# Patient Record
Sex: MALE | Race: WHITE | NOT HISPANIC OR LATINO | Employment: FULL TIME | URBAN - METROPOLITAN AREA
[De-identification: names, ages, dates, MRNs, and addresses within clinical notes are randomized per-mention and may not be internally consistent; named-entity substitution may affect disease eponyms.]

---

## 2019-02-15 ENCOUNTER — TELEPHONE (OUTPATIENT)
Dept: GASTROENTEROLOGY | Facility: CLINIC | Age: 39
End: 2019-02-15

## 2019-02-15 DIAGNOSIS — K64.9 HEMORRHOIDS, UNSPECIFIED HEMORRHOID TYPE: ICD-10-CM

## 2019-02-15 DIAGNOSIS — K62.5 RECTAL BLEEDING: Primary | ICD-10-CM

## 2019-02-15 NOTE — TELEPHONE ENCOUNTER
Pt called to schedule colonoscopy ordered from October 2018 visit with [facility]  He is scheduled for 3/12/19  He had prep at that time, but did not  RX at the pharmacy, believes he still has instructions; will check  Needs phone prep redo  Given to MA to complete

## 2019-02-28 RX ORDER — VALSARTAN AND HYDROCHLOROTHIAZIDE 160; 25 MG/1; MG/1
1 TABLET ORAL DAILY
COMMUNITY

## 2019-02-28 NOTE — TELEPHONE ENCOUNTER
Phone prep with pt  Dx: rectal bleeding, hemorrhoids  Rx sent to provider for signature  Instructions for suprep given  Meds reviewed  Pt has all paperwork   WT: 225 HT:5'10" BMI: 32 3

## 2019-02-28 NOTE — TELEPHONE ENCOUNTER
Pt left [facility] stating Shannan Claros called/can reach him on cell [phone]

## 2019-03-04 ENCOUNTER — TELEPHONE (OUTPATIENT)
Dept: GASTROENTEROLOGY | Facility: CLINIC | Age: 39
End: 2019-03-04

## 2019-03-12 ENCOUNTER — TELEPHONE (OUTPATIENT)
Dept: GASTROENTEROLOGY | Facility: CLINIC | Age: 39
End: 2019-03-12

## 2019-03-12 DIAGNOSIS — K59.04 FUNCTIONAL CONSTIPATION: Primary | ICD-10-CM

## 2019-03-12 NOTE — TELEPHONE ENCOUNTER
Pt had a procedure today and was given as script for Linzess 145 mcg 1 daily, 90 with 3 refills per Dr Dwain Shaw

## 2019-04-02 ENCOUNTER — OFFICE VISIT (OUTPATIENT)
Dept: GASTROENTEROLOGY | Facility: CLINIC | Age: 39
End: 2019-04-02
Payer: COMMERCIAL

## 2019-04-02 VITALS
SYSTOLIC BLOOD PRESSURE: 130 MMHG | HEIGHT: 69 IN | BODY MASS INDEX: 33.33 KG/M2 | WEIGHT: 225 LBS | HEART RATE: 64 BPM | DIASTOLIC BLOOD PRESSURE: 80 MMHG

## 2019-04-02 DIAGNOSIS — Z12.11 COLON CANCER SCREENING: ICD-10-CM

## 2019-04-02 DIAGNOSIS — K64.1 GRADE II HEMORRHOIDS: Primary | ICD-10-CM

## 2019-04-02 DIAGNOSIS — K59.04 CHRONIC IDIOPATHIC CONSTIPATION: ICD-10-CM

## 2019-04-02 PROCEDURE — 46221 LIGATION OF HEMORRHOID(S): CPT | Performed by: INTERNAL MEDICINE

## 2019-04-26 ENCOUNTER — OFFICE VISIT (OUTPATIENT)
Dept: GASTROENTEROLOGY | Facility: CLINIC | Age: 39
End: 2019-04-26
Payer: COMMERCIAL

## 2019-04-26 VITALS
HEIGHT: 69 IN | WEIGHT: 224 LBS | SYSTOLIC BLOOD PRESSURE: 140 MMHG | HEART RATE: 65 BPM | BODY MASS INDEX: 33.18 KG/M2 | DIASTOLIC BLOOD PRESSURE: 82 MMHG

## 2019-04-26 DIAGNOSIS — K64.1 GRADE II HEMORRHOIDS: ICD-10-CM

## 2019-04-26 PROCEDURE — 46221 LIGATION OF HEMORRHOID(S): CPT | Performed by: INTERNAL MEDICINE

## 2019-05-16 ENCOUNTER — OFFICE VISIT (OUTPATIENT)
Dept: GASTROENTEROLOGY | Facility: CLINIC | Age: 39
End: 2019-05-16
Payer: COMMERCIAL

## 2019-05-16 VITALS
HEIGHT: 69 IN | SYSTOLIC BLOOD PRESSURE: 144 MMHG | BODY MASS INDEX: 32.88 KG/M2 | DIASTOLIC BLOOD PRESSURE: 94 MMHG | HEART RATE: 78 BPM | WEIGHT: 222 LBS

## 2019-05-16 DIAGNOSIS — K59.04 CHRONIC IDIOPATHIC CONSTIPATION: Primary | ICD-10-CM

## 2019-05-16 DIAGNOSIS — K64.8 INTERNAL HEMORRHOIDS: ICD-10-CM

## 2019-05-16 PROCEDURE — 46221 LIGATION OF HEMORRHOID(S): CPT | Performed by: INTERNAL MEDICINE

## 2019-05-16 PROCEDURE — 99213 OFFICE O/P EST LOW 20 MIN: CPT | Performed by: INTERNAL MEDICINE

## 2019-06-28 ENCOUNTER — OFFICE VISIT (OUTPATIENT)
Dept: GASTROENTEROLOGY | Facility: CLINIC | Age: 39
End: 2019-06-28
Payer: COMMERCIAL

## 2019-06-28 VITALS
HEART RATE: 58 BPM | SYSTOLIC BLOOD PRESSURE: 138 MMHG | BODY MASS INDEX: 33.18 KG/M2 | WEIGHT: 224 LBS | DIASTOLIC BLOOD PRESSURE: 82 MMHG | HEIGHT: 69 IN

## 2019-06-28 DIAGNOSIS — K64.1 GRADE II HEMORRHOIDS: ICD-10-CM

## 2019-06-28 DIAGNOSIS — K59.04 CHRONIC IDIOPATHIC CONSTIPATION: Primary | ICD-10-CM

## 2019-06-28 PROCEDURE — 99213 OFFICE O/P EST LOW 20 MIN: CPT | Performed by: INTERNAL MEDICINE

## 2019-06-28 RX ORDER — LUBIPROSTONE 8 UG/1
8 CAPSULE, GELATIN COATED ORAL 2 TIMES DAILY WITH MEALS
Qty: 60 CAPSULE | Refills: 2 | Status: SHIPPED | OUTPATIENT
Start: 2019-06-28 | End: 2019-06-28

## 2019-06-28 RX ORDER — LUBIPROSTONE 8 UG/1
8 CAPSULE, GELATIN COATED ORAL 2 TIMES DAILY WITH MEALS
Qty: 60 CAPSULE | Refills: 2 | Status: SHIPPED | OUTPATIENT
Start: 2019-06-28 | End: 2019-10-08 | Stop reason: ALTCHOICE

## 2019-07-15 DIAGNOSIS — K59.04 CHRONIC IDIOPATHIC CONSTIPATION: Primary | ICD-10-CM

## 2019-07-15 NOTE — TELEPHONE ENCOUNTER
[facility]ed [facility], Poonam Al, and gave her a verbal order for the Amitiza 8 mcg BID with meals, 180 with 1 refill  She noted that being a mail order needs to be 90 days  She is unable to give a cost of the medication as is not yet processed  Requested pt call after 24 hours to discuss the cost and if wants to proceed with the order  He can call [phone]  Pt notified and # written for him on his sample bag  He will call back if has any other issues

## 2019-07-15 NOTE — TELEPHONE ENCOUNTER
Pt called stating was started on samples of Amitiza 8 mcg BID and was very effective  He went to have the script filled at [facility] and was $300 which he can not afford  Questions if can be sent to [facility] for 30 days as his insurance will only cover 30 at a time for a cheaper amount? Also questions if we have any more samples?    2 boxes of samples ready for the pt to

## 2019-08-01 ENCOUNTER — OFFICE VISIT (OUTPATIENT)
Dept: GASTROENTEROLOGY | Facility: CLINIC | Age: 39
End: 2019-08-01
Payer: COMMERCIAL

## 2019-08-01 VITALS
DIASTOLIC BLOOD PRESSURE: 82 MMHG | WEIGHT: 222 LBS | HEIGHT: 69 IN | SYSTOLIC BLOOD PRESSURE: 118 MMHG | HEART RATE: 64 BPM | BODY MASS INDEX: 32.88 KG/M2

## 2019-08-01 DIAGNOSIS — K58.1 IRRITABLE BOWEL SYNDROME WITH CONSTIPATION: Primary | ICD-10-CM

## 2019-08-01 DIAGNOSIS — K64.2 GRADE III HEMORRHOIDS: ICD-10-CM

## 2019-08-01 DIAGNOSIS — K42.9 UMBILICAL HERNIA WITHOUT OBSTRUCTION AND WITHOUT GANGRENE: ICD-10-CM

## 2019-08-01 PROCEDURE — 99213 OFFICE O/P EST LOW 20 MIN: CPT | Performed by: INTERNAL MEDICINE

## 2019-08-01 NOTE — PROGRESS NOTES
[facility] - Outpatient Follow-up Note  Malia Catherine 35 y o  male MRN: [id number]  Encounter: [id number]    ASSESSMENT AND PLAN:      1  Irritable bowel syndrome with constipation  Significant improvement on Amitiza 8 mcg twice daily  We will increase to 24 mcg twice daily, he wants to try samples 1st but they are not available, we will contact him when they arrive    2  Grade III hemorrhoids  Improved, but not resolved, with treating constipation and straining  Once lower GI symptoms stabilize he may benefit from repeat banding    3  Umbilical hernia without obstruction and without gangrene  Plan surgical repair when constipation/straining improves      Followup Appointment:  2 months  ______________________________________________________________________    Chief Complaint   Patient presents with    Follow up, IBS     HPI:  The patient returns for follow-up on constipation predominant IBS and internal hemorrhoids  Symptoms have been much better controlled since starting Amitiza 8 mcg twice daily  He typically has a stool every other day  Stools typically took 45 min, now they take about 20  He still needs to strain to initiate a bowel movement  Rectal bleeding is much less significant any feels that his hemorrhoids are smaller  He continues to take fiber twice daily    He takes a senna tea as needed    Historical Information   Past Medical History:   Diagnosis Date    Constipation     Hemorrhoids     Hypercholesterolemia     Hypertension      Past Surgical History:   Procedure Laterality Date    COLONOSCOPY  03/12/2019    One hyperplastic polyp, recall at age 47   [address] History     Substance and Sexual Activity   Alcohol Use Yes    Frequency: 2-4 times a month     Social History     Substance and Sexual Activity   Drug Use Never     Social History     Tobacco Use   Smoking Status Never Smoker   Smokeless Tobacco Never Used     Family History   Problem Relation Age of Onset    Hypertension Mother     Hypertension Father     Hypertension Brother     Colon cancer Neg Hx     Colon polyps Neg Hx          Current Outpatient Medications:     lubiprostone (AMITIZA) 8 mcg capsule    valsartan-hydrochlorothiazide (DIOVAN-HCT) 160-25 MG per tablet  No Known Allergies    10 Point REVIEW OF SYSTEMS IS OTHERWISE NEGATIVE  PHYSICAL EXAM:    Blood pressure 118/82, pulse 64, height 5' 9" (1 753 m), weight 101 kg (222 lb)  Body mass index is 32 78 kg/m²  General Appearance:  Alert, cooperative, no distress  HEENT:  Normocephalic, atraumatic, anicteric  Neck: Supple, symmetrical, trachea midline  Lungs: Clear to auscultation bilaterally; no rales, rhonchi or wheezing; respirations unlabored   Heart: Regular rate and rhythm; no murmur, rub, or gallop  Abdomen:   Soft, non-tender, non-distended; normal bowel sounds; no masses, no organomegaly   Rectal:  Deferred   Extremities:  No cyanosis, clubbing or edema   Skin:  No jaundice, rashes, or lesions   Lymph nodes: No palpable cervical lymphadenopathy     Lab Results:   No results found for: WBC, HGB, HCT, MCV, PLT  No results found for: NA, K, CL, CO2, ANIONGAP, BUN, CREATININE, GLUCOSE, GLUF, CALCIUM, CORRECTEDCA, AST, ALT, ALKPHOS, PROT, BILITOT, EGFR  No results found for: IRON, TIBC, FERRITIN  No results found for: LIPASE    Radiology Results:   No results found

## 2019-08-01 NOTE — LETTER
August 1, 2019     Severo Escamilla MD  [address] [place] [address]  [address] [place] [address]    Patient: Lisseth Peña   YOB: 1980   Date of Visit: 8/1/2019       Dear Dr Geovanna Ricks: Thank you for referring Lisseth Peña to me for evaluation  Below are my notes for this consultation  If you have questions, please do not hesitate to call me  I look forward to following your patient along with you  Sincerely,        Rosy Damon DO        CC: No Recipients  Rosy Damon DO  8/1/2019  5:19 PM  Sign at close encounter  [facility] - Outpatient Follow-up Note  Lisseth Peña 35 y o  male MRN: [id number]  Encounter: [id number]    ASSESSMENT AND PLAN:      1  Irritable bowel syndrome with constipation  Significant improvement on Amitiza 8 mcg twice daily  We will increase to 24 mcg twice daily, he wants to try samples 1st but they are not available, we will contact him when they arrive    2  Grade III hemorrhoids  Improved, but not resolved, with treating constipation and straining  Once lower GI symptoms stabilize he may benefit from repeat banding    3  Umbilical hernia without obstruction and without gangrene  Plan surgical repair when constipation/straining improves      Followup Appointment:  2 months  ______________________________________________________________________    Chief Complaint   Patient presents with    Follow up, IBS     HPI:  The patient returns for follow-up on constipation predominant IBS and internal hemorrhoids  Symptoms have been much better controlled since starting Amitiza 8 mcg twice daily  He typically has a stool every other day  Stools typically took 45 min, now they take about 20  He still needs to strain to initiate a bowel movement  Rectal bleeding is much less significant any feels that his hemorrhoids are smaller  He continues to take fiber twice daily    He takes a senna tea as needed    Historical Information   Past Medical History:   Diagnosis Date    Constipation     Hemorrhoids     Hypercholesterolemia     Hypertension      Past Surgical History:   Procedure Laterality Date    COLONOSCOPY  03/12/2019    One hyperplastic polyp, recall at age 47   [address] History     Substance and Sexual Activity   Alcohol Use Yes    Frequency: 2-4 times a month     Social History     Substance and Sexual Activity   Drug Use Never     Social History     Tobacco Use   Smoking Status Never Smoker   Smokeless Tobacco Never Used     Family History   Problem Relation Age of Onset    Hypertension Mother     Hypertension Father     Hypertension Brother     Colon cancer Neg Hx     Colon polyps Neg Hx          Current Outpatient Medications:     lubiprostone (AMITIZA) 8 mcg capsule    valsartan-hydrochlorothiazide (DIOVAN-HCT) 160-25 MG per tablet  No Known Allergies    10 Point REVIEW OF SYSTEMS IS OTHERWISE NEGATIVE  PHYSICAL EXAM:    Blood pressure 118/82, pulse 64, height 5' 9" (1 753 m), weight 101 kg (222 lb)  Body mass index is 32 78 kg/m²  General Appearance:  Alert, cooperative, no distress  HEENT:  Normocephalic, atraumatic, anicteric  Neck: Supple, symmetrical, trachea midline  Lungs: Clear to auscultation bilaterally; no rales, rhonchi or wheezing; respirations unlabored   Heart: Regular rate and rhythm; no murmur, rub, or gallop  Abdomen:   Soft, non-tender, non-distended; normal bowel sounds; no masses, no organomegaly   Rectal:  Deferred   Extremities:  No cyanosis, clubbing or edema   Skin:  No jaundice, rashes, or lesions   Lymph nodes: No palpable cervical lymphadenopathy     Lab Results:   No results found for: WBC, HGB, HCT, MCV, PLT  No results found for: NA, K, CL, CO2, ANIONGAP, BUN, CREATININE, GLUCOSE, GLUF, CALCIUM, CORRECTEDCA, AST, ALT, ALKPHOS, PROT, BILITOT, EGFR  No results found for: IRON, TIBC, FERRITIN  No results found for: LIPASE    Radiology Results:   No results found

## 2019-10-03 ENCOUNTER — OFFICE VISIT (OUTPATIENT)
Dept: GASTROENTEROLOGY | Facility: CLINIC | Age: 39
End: 2019-10-03
Payer: COMMERCIAL

## 2019-10-03 VITALS
SYSTOLIC BLOOD PRESSURE: 162 MMHG | WEIGHT: 224 LBS | BODY MASS INDEX: 33.18 KG/M2 | HEIGHT: 69 IN | HEART RATE: 68 BPM | DIASTOLIC BLOOD PRESSURE: 100 MMHG

## 2019-10-03 DIAGNOSIS — K59.04 CHRONIC IDIOPATHIC CONSTIPATION: Primary | ICD-10-CM

## 2019-10-03 DIAGNOSIS — K64.2 GRADE III HEMORRHOIDS: ICD-10-CM

## 2019-10-03 PROCEDURE — 99213 OFFICE O/P EST LOW 20 MIN: CPT | Performed by: INTERNAL MEDICINE

## 2019-10-03 NOTE — PROGRESS NOTES
[facility] - Outpatient Follow-up Note  Linden Vega 35 y o  male MRN: [id number]  Encounter: [id number]    ASSESSMENT AND PLAN:      1  IBS with constipation  Tried increasing Amitiza 24 mcg twice daily with irregular stools, occasional good days but typically still needs to strain  Provided a prescription and co-pay card for Trulance    2  Grade III hemorrhoids  Clinical improvement but he still notes blood with most stools  Continue to work on constipation and straining  Once these have improved will discuss repeat banding or surgical referral      Followup Appointment:  2-3 months  ______________________________________________________________________    Chief Complaint   Patient presents with    Follow-up     IBS     HPI:  The patient returns for follow-up on IBS-c and hemorrhoidal bleeding  At his last visit we increased Amitiza to 24 b i d  There was some small improvement  Once or twice per week he had 1 easy stool per day  Other days he had loose stool with return trips to the toilet requiring constipation  He continues to see blood with nearly every stool  He denies any abdominal pain or pain with defecation      Historical Information   Past Medical History:   Diagnosis Date    Constipation     Hemorrhoids     Hypercholesterolemia     Hypertension      Past Surgical History:   Procedure Laterality Date    COLONOSCOPY  03/12/2019    One hyperplastic polyp, recall at age 47   [address] History     Substance and Sexual Activity   Alcohol Use Yes    Frequency: 2-4 times a month     Social History     Substance and Sexual Activity   Drug Use Never     Social History     Tobacco Use   Smoking Status Never Smoker   Smokeless Tobacco Never Used     Family History   Problem Relation Age of Onset    Hypertension Mother     Hypertension Father     Hypertension Brother     Colon cancer Neg Hx     Colon polyps Neg Hx          Current Outpatient Medications:     lubiprostone (AMITIZA) 8 mcg capsule    valsartan-hydrochlorothiazide (DIOVAN-HCT) 160-25 MG per tablet  No Known Allergies    PHYSICAL EXAM:    Blood pressure 162/100, pulse 68, height 5' 9" (1 753 m), weight 102 kg (224 lb)  Body mass index is 33 08 kg/m²  General Appearance: NAD, cooperative, alert  Eyes: Anicteric, PERRLA, EOMI  ENT:  Normocephalic, atraumatic, normal mucosa  Neck:  Supple, symmetrical, trachea midline  Resp:  Clear to auscultation bilaterally; no rales, rhonchi or wheezing; respirations unlabored   CV:  S1 S2, Regular rate and rhythm; no murmur, rub, or gallop  GI:  Soft, non-tender, non-distended; normal bowel sounds; no masses, no organomegaly   Rectal:  1 small reducible grade 3 hemorrhoid  Musculoskeletal: No cyanosis, clubbing or edema  Normal ROM  Skin:  No jaundice, rashes, or lesions   Heme/Lymph: No palpable cervical lymphadenopathy  Psych: Normal affect, good eye contact  Neuro: No gross deficits, AAOx3    Lab Results:   No results found for: WBC, HGB, HCT, MCV, PLT  No results found for: NA, K, CL, CO2, ANIONGAP, BUN, CREATININE, GLUCOSE, GLUF, CALCIUM, CORRECTEDCA, AST, ALT, ALKPHOS, PROT, BILITOT, EGFR  No results found for: IRON, TIBC, FERRITIN  No results found for: LIPASE    Radiology Results:   No results found

## 2019-10-08 DIAGNOSIS — K59.04 CHRONIC IDIOPATHIC CONSTIPATION: ICD-10-CM

## 2019-10-08 NOTE — TELEPHONE ENCOUNTER
Pt left  mssg stating he saw Dr Jenni Watson last [address] and was supposed to get Trulance prescribed; [facility] and no order; questions if he was going to get the Amitiza again instead? If Amitiza needs the 24 not 8   If Trulance, questions how to get ordered; req  [phone]      Pt notified [facility] to [facility] will be re-sent

## 2019-10-08 NOTE — TELEPHONE ENCOUNTER
Original medication order printed and did not go to [facility]   I called and provided verbal     BIN [id number] PCN [facility] [id number] ID [id number]

## 2019-10-09 NOTE — TELEPHONE ENCOUNTER
We do not have any amitiza or trulance samples  Patient advised  We are going to try sending a short rx to [facility] for Amitiza 24 takes 2 a day qty 10 to see if patient can afford this

## 2019-10-09 NOTE — TELEPHONE ENCOUNTER
Pt left [facility] stating he called yesterday and conf'd Trulance script went but will not be sent out until 10/14; asks if he can  Amitza samples?/only has one day left  [phone]

## 2019-10-10 NOTE — TELEPHONE ENCOUNTER
Left message for patient to review the Trulance savings card to see if we can get that filled locally and he could use it to get the drug

## 2019-10-10 NOTE — TELEPHONE ENCOUNTER
Spoke with patient again  He did not keep the savings card and states they tried that with Amitiza already  Patient is [facility] the Amitiza 24 BID so he can get the pricing on that    Routing to [facility] to sign off/send rx for KK and routing to [facility] as [employer]

## 2019-10-10 NOTE — TELEPHONE ENCOUNTER
Pt left  mssg stating script was put through to [facility] but Trulance is $3000; also the nurse sent short supply of Amitiza to [facility] but it's $80 for about 10 pills    # [phone]

## 2019-11-21 ENCOUNTER — OFFICE VISIT (OUTPATIENT)
Dept: GASTROENTEROLOGY | Facility: CLINIC | Age: 39
End: 2019-11-21
Payer: COMMERCIAL

## 2019-11-21 VITALS
DIASTOLIC BLOOD PRESSURE: 84 MMHG | WEIGHT: 229 LBS | HEART RATE: 71 BPM | HEIGHT: 69 IN | BODY MASS INDEX: 33.92 KG/M2 | SYSTOLIC BLOOD PRESSURE: 142 MMHG

## 2019-11-21 DIAGNOSIS — K42.9 UMBILICAL HERNIA WITHOUT OBSTRUCTION AND WITHOUT GANGRENE: ICD-10-CM

## 2019-11-21 DIAGNOSIS — K59.04 CHRONIC IDIOPATHIC CONSTIPATION: Primary | ICD-10-CM

## 2019-11-21 DIAGNOSIS — K64.2 GRADE III HEMORRHOIDS: ICD-10-CM

## 2019-11-21 PROCEDURE — 46221 LIGATION OF HEMORRHOID(S): CPT | Performed by: INTERNAL MEDICINE

## 2019-11-21 PROCEDURE — 99213 OFFICE O/P EST LOW 20 MIN: CPT | Performed by: INTERNAL MEDICINE

## 2019-11-21 NOTE — PROGRESS NOTES
[address] [facility] - Outpatient Follow-up Note  Rosie Vaz 35 y o  male MRN: [id number]  Encounter: [id number]    ASSESSMENT AND PLAN:      1  Chronic idiopathic constipation  Very good control with Amitiza  He typically takes it once daily at bedtime  He will take b i d  If no bowel movement in more than 24 hours  He is very satisfied with symptom improvement  2  Grade III hemorrhoids  Treated with banding today    3  Umbilical hernia without obstruction and without gangrene  [facility]  Previous delayed due to constipation /straining, but this is much better controlled    The right posterior hemorrhoid area was banded today  The patient was instructed to avoid constipation and straining, and educated in appropriate fiber intake  HPI: Rosie Vaz is a 35y o  year old male who presents with rectal bleeding due to hemorrhoids  Previous treatments include:  Banding x3 in April 2019  Current Fiber intake:  Dietary fiber, Amitiza daily  Complications of prior therapy include:  None    The patient provided consent for banding  and was placed in the left lateral position  Rectal exam showed grade 3 hemorrhoid in the right posterior position  The [place] ligator was advanced and a band was applied without difficulty   Repeat rectal exam confirmed appropriate placement  The patient was discharged home after observation in the waiting area  Followup Appointment:  1 month  ______________________________________________________________________    Chief Complaint   Patient presents with    Follow-up    Hemorrhoids     HPI:  The patient returns for follow-up on constipation and hemorrhoids  At his last office visit he had fair improvement with Amitiza 24 b i d  (he was actually taking 3 8 micro g pills at a time)  We tried to prescribe Trulance but was cost prohibitive  We then started Amitiza 24 mcg pills, twice daily    These provided much better relief  He now takes 1 pill each day at bedtime  He typically has a spontaneous bowel movement the following day  If he has had no stool in 24 hours he will take the medication twice daily  He is very satisfied with symptom improvement  He reports minimal straining  He has a mild amount of blood that he ascribes to hemorrhoids    Historical Information   Past Medical History:   Diagnosis Date    Constipation     Hemorrhoids     Hypercholesterolemia     Hypertension      Past Surgical History:   Procedure Laterality Date    COLONOSCOPY  03/12/2019    One hyperplastic polyp, recall at age 47   [address] History     Substance and Sexual Activity   Alcohol Use Yes    Frequency: 2-4 times a month     Social History     Substance and Sexual Activity   Drug Use Never     Social History     Tobacco Use   Smoking Status Never Smoker   Smokeless Tobacco Never Used     Family History   Problem Relation Age of Onset    Hypertension Mother     Hypertension Father     Hypertension Brother     Colon cancer Neg Hx     Colon polyps Neg Hx          Current Outpatient Medications:     lubiprostone (AMITIZA) 24 mcg capsule    valsartan-hydrochlorothiazide (DIOVAN-HCT) 160-25 MG per tablet  No Known Allergies  Reviewed medications and allergies and updated as indicated    PHYSICAL EXAM:    Blood pressure 142/84, pulse 71, height 5' 9" (1 753 m), weight 104 kg (229 lb)  Body mass index is 33 82 kg/m²  General Appearance: NAD, cooperative, alert  Eyes: Anicteric, PERRLA, EOMI  ENT:  Normocephalic, atraumatic, normal mucosa  Neck:  Supple, symmetrical, trachea midline  Resp:  Clear to auscultation bilaterally; no rales, rhonchi or wheezing; respirations unlabored   CV:  S1 S2, Regular rate and rhythm; no murmur, rub, or gallop    GI:  Soft, non-tender, non-distended; normal bowel sounds; no masses, no organomegaly, reducible ventral hernia    Rectal:  Internal hemorrhoid, right posterior  Musculoskeletal: No cyanosis, clubbing or edema  Normal ROM  Skin:  No jaundice, rashes, or lesions   Heme/Lymph: No palpable cervical lymphadenopathy  Psych: Normal affect, good eye contact  Neuro: No gross deficits, AAOx3    Lab Results:   No results found for: WBC, HGB, HCT, MCV, PLT  No results found for: NA, K, CL, CO2, ANIONGAP, BUN, CREATININE, GLUCOSE, GLUF, CALCIUM, CORRECTEDCA, AST, ALT, ALKPHOS, PROT, BILITOT, EGFR  No results found for: IRON, TIBC, FERRITIN  No results found for: LIPASE    Radiology Results:   No results found

## 2019-11-21 NOTE — LETTER
November 21, 2019     Armida Dave MD  [address]  [address] [facility]    Patient: Frankie Pate   YOB: 1980   Date of Visit: 11/21/2019       Dear Dr Shauna Headley: Thank you for referring Frankie Pate to me for evaluation  Below are my notes for this consultation  If you have questions, please do not hesitate to call me  I look forward to following your patient along with you  Sincerely,        Laly Gonzales DO        CC: MD Laly Perez DO  11/21/2019  2:22 PM  Incomplete  [address] [facility] - Outpatient Follow-up Note  Frankie Pate 35 y o  male MRN: [id number]  Encounter: [id number]    ASSESSMENT AND PLAN:      1  Chronic idiopathic constipation  Very good control with Amitiza  He typically takes it once daily at bedtime  He will take b i d  If no bowel movement in more than 24 hours  He is very satisfied with symptom improvement  2  Grade III hemorrhoids  Treated with banding today    3  Umbilical hernia without obstruction and without gangrene  He will arrange consult with [facility] surgeons  Previous delayed due to constipation /straining, but this is much better controlled    The right posterior hemorrhoid area was banded today  The patient was instructed to avoid constipation and straining, and educated in appropriate fiber intake  HPI: Frankie Pate is a 35y o  year old male who presents with rectal bleeding due to hemorrhoids       Previous treatments include:  Banding x3 in April 2019  Current Fiber intake:  Dietary fiber, Amitiza daily  Complications of prior therapy include:  None    The patient provided consent for banding  and was placed in the left lateral position  Rectal exam showed grade 3 hemorrhoid in the right posterior position  The [place] ligator was advanced and a band was applied without difficulty   Repeat rectal exam confirmed appropriate placement  The patient was discharged home after observation in the waiting area  Followup Appointment:  1 month  ______________________________________________________________________    Chief Complaint   Patient presents with    Follow-up    Hemorrhoids     HPI:  The patient returns for follow-up on constipation and hemorrhoids  At his last office visit he had fair improvement with Amitiza 24 b i d  (he was actually taking 3 8 micro g pills at a time)  We tried to prescribe Trulance but was cost prohibitive  We then started Amitiza 24 mcg pills, twice daily  These provided much better relief  He now takes 1 pill each day at bedtime  He typically has a spontaneous bowel movement the following day  If he has had no stool in 24 hours he will take the medication twice daily  He is very satisfied with symptom improvement  He reports minimal straining    He has a mild amount of blood that he ascribes to hemorrhoids    Historical Information   Past Medical History:   Diagnosis Date    Constipation     Hemorrhoids     Hypercholesterolemia     Hypertension      Past Surgical History:   Procedure Laterality Date    COLONOSCOPY  03/12/2019    One hyperplastic polyp, recall at age 47   [address] History     Substance and Sexual Activity   Alcohol Use Yes    Frequency: 2-4 times a month     Social History     Substance and Sexual Activity   Drug Use Never     Social History     Tobacco Use   Smoking Status Never Smoker   Smokeless Tobacco Never Used     Family History   Problem Relation Age of Onset    Hypertension Mother     Hypertension Father     Hypertension Brother     Colon cancer Neg Hx     Colon polyps Neg Hx          Current Outpatient Medications:     lubiprostone (AMITIZA) 24 mcg capsule    valsartan-hydrochlorothiazide (DIOVAN-HCT) 160-25 MG per tablet  No Known Allergies  Reviewed medications and allergies and updated as indicated    PHYSICAL EXAM:    Blood pressure 142/84, pulse 71, height 5' 9" (1 753 m), weight 104 kg (229 lb)  Body mass index is 33 82 kg/m²  General Appearance: NAD, cooperative, alert  Eyes: Anicteric, PERRLA, EOMI  ENT:  Normocephalic, atraumatic, normal mucosa  Neck:  Supple, symmetrical, trachea midline  Resp:  Clear to auscultation bilaterally; no rales, rhonchi or wheezing; respirations unlabored   CV:  S1 S2, Regular rate and rhythm; no murmur, rub, or gallop  GI:  Soft, non-tender, non-distended; normal bowel sounds; no masses, no organomegaly, reducible ventral hernia    Rectal:  Internal hemorrhoid, right posterior  Musculoskeletal: No cyanosis, clubbing or edema  Normal ROM  Skin:  No jaundice, rashes, or lesions   Heme/Lymph: No palpable cervical lymphadenopathy  Psych: Normal affect, good eye contact  Neuro: No gross deficits, AAOx3    Lab Results:   No results found for: WBC, HGB, HCT, MCV, PLT  No results found for: NA, K, CL, CO2, ANIONGAP, BUN, CREATININE, GLUCOSE, GLUF, CALCIUM, CORRECTEDCA, AST, ALT, ALKPHOS, PROT, BILITOT, EGFR  No results found for: IRON, TIBC, FERRITIN  No results found for: LIPASE    Radiology Results:   No results found

## 2019-11-25 ENCOUNTER — TELEPHONE (OUTPATIENT)
Dept: GASTROENTEROLOGY | Facility: CLINIC | Age: 39
End: 2019-11-25

## 2019-11-25 ENCOUNTER — OFFICE VISIT (OUTPATIENT)
Dept: GASTROENTEROLOGY | Facility: CLINIC | Age: 39
End: 2019-11-25
Payer: COMMERCIAL

## 2019-11-25 VITALS
HEART RATE: 86 BPM | BODY MASS INDEX: 32.88 KG/M2 | DIASTOLIC BLOOD PRESSURE: 84 MMHG | HEIGHT: 69 IN | WEIGHT: 222 LBS | SYSTOLIC BLOOD PRESSURE: 132 MMHG

## 2019-11-25 DIAGNOSIS — Z12.11 COLON CANCER SCREENING: ICD-10-CM

## 2019-11-25 DIAGNOSIS — K59.09 OTHER CONSTIPATION: ICD-10-CM

## 2019-11-25 DIAGNOSIS — K62.5 RECTAL BLEEDING: ICD-10-CM

## 2019-11-25 DIAGNOSIS — K62.89 RECTAL PAIN: Primary | ICD-10-CM

## 2019-11-25 DIAGNOSIS — K64.2 GRADE III HEMORRHOIDS: ICD-10-CM

## 2019-11-25 PROCEDURE — 99214 OFFICE O/P EST MOD 30 MIN: CPT | Performed by: INTERNAL MEDICINE

## 2019-11-25 NOTE — PATIENT INSTRUCTIONS
Referral to colorectal surgeon, Dr Margo Osuna to increase fluid and fiber  Daily fiber supplementation and Amitiza

## 2019-11-25 NOTE — TELEPHONE ENCOUNTER
Pt asks if he needs to come in?; had banding Thur; fell off Fri night; has rectal bldg & pain 6-8 but 9 last night  Dr Jessica Hererra off  Pt offered/accepted appt today w/ NP, accepted  CB# was [phone]

## 2020-01-23 ENCOUNTER — TELEPHONE (OUTPATIENT)
Dept: GASTROENTEROLOGY | Facility: CLINIC | Age: 40
End: 2020-01-23

## 2020-01-23 NOTE — TELEPHONE ENCOUNTER
Pt's wife, Ella Lung, states KK prescribes Amitiza and med is more expensive this yr; asks if we have savings cards? She tried to go online but could not register  Conf'd mailing address  I will mail savings card to her

## 2020-07-23 ENCOUNTER — EVALUATION (OUTPATIENT)
Dept: PHYSICAL THERAPY | Facility: CLINIC | Age: 40
End: 2020-07-23
Payer: COMMERCIAL

## 2020-07-23 DIAGNOSIS — S86.812A STRAIN OF LEFT CALF MUSCLE: Primary | ICD-10-CM

## 2020-07-23 PROCEDURE — 97161 PT EVAL LOW COMPLEX 20 MIN: CPT | Performed by: PHYSICAL THERAPIST

## 2020-07-23 PROCEDURE — 97140 MANUAL THERAPY 1/> REGIONS: CPT | Performed by: PHYSICAL THERAPIST

## 2020-07-23 NOTE — PROGRESS NOTES
PT Evaluation     Today's date: 2020  Patient name: Lashae Saleem  : 1980  MRN: [id number]  Referring provider: Paul Velazquez DPM  Dx:   Encounter Diagnosis     ICD-10-CM    1  Strain of left calf muscle S86 812A                   Assessment  Assessment details: Patient is a 38 y o  male who presents to outpatient PT with pain, decreased rom, decreased strength and decreased functional mobility  He will benefit from skilled PT to address these deficits in order to achieve his goals and maximize his functional mobility  Goals  Short Term Goals: Independent performance of initial hep  Decrease pain 2 points on VAS      Long Term Goals: Independent performance of comprehensive hep  Work performance is returned to max level of function  Performance of IADL's is returned to max level of function  Performance in recreational activities is improved to max level of function  Able to run with min pain     Plan  Plan details: 1-2x's/week for 4-6 weeks  Planned therapy interventions: abdominal trunk stabilization, joint mobilization, manual therapy, IADL retraining, strengthening, stretching, therapeutic activities, therapeutic exercise, therapeutic training and home exercise program        Subjective Evaluation    History of Present Illness  Mechanism of injury: Pt reports he started to experience achilles pain 6 weeks ago after running  Reports that he rested for 4 weeks with good pain reduction but when he attempted to run again the pain return  Reports that he now has soreness when walking and with bike ridding  X-ray was neg per patient report  Reports "my feet are really messed up"  Reports ongoing plantar faciitis and reports that he may have fx is foot when training for a 1/2 marathon but never had this tx  Attempted OTC orthotics but this increased  Occasionally takes OTC ibeprofin with min pain reduction      Pain  Current pain ratin  At best pain ratin  At worst pain ratin    Patient Goals  Patient goals for therapy: decreased pain, increased motion, increased strength, independence with ADLs/IADLs, return to sport/leisure activities and return to work          Objective       L ankle:       ROM:    DF: -3  PF: wfl  INV: 32  EV 17      STRENGTH:  Df: 5-/5  Pf: 5/5  INV:  5-/5  Ev:  5-/5      Talar tilt test: neg  Too many toes sign  TTP mid-substance of gastroc and along posterior tib tendon  Ambulates with antalgic gait pattern             Precautions: none      Manuals             graston kl            laser 4'            k-tape prn                          Neuro Re-Ed                                                                                                        Ther Ex             gastroc stretch off step             Soleus stretch             Hr/tr             Short foot             Achilles eccentric as pain reduces                                                        Ther Activity             bike                          Gait Training                                       Modalities

## 2020-07-29 ENCOUNTER — OFFICE VISIT (OUTPATIENT)
Dept: PHYSICAL THERAPY | Facility: CLINIC | Age: 40
End: 2020-07-29
Payer: COMMERCIAL

## 2020-07-29 DIAGNOSIS — S86.812D STRAIN OF CALF MUSCLE, LEFT, SUBSEQUENT ENCOUNTER: Primary | ICD-10-CM

## 2020-07-29 PROCEDURE — 97140 MANUAL THERAPY 1/> REGIONS: CPT | Performed by: PHYSICAL THERAPIST

## 2020-07-29 PROCEDURE — 97530 THERAPEUTIC ACTIVITIES: CPT | Performed by: PHYSICAL THERAPIST

## 2020-07-29 PROCEDURE — 97110 THERAPEUTIC EXERCISES: CPT | Performed by: PHYSICAL THERAPIST

## 2020-07-29 NOTE — PROGRESS NOTES
Daily Note     Today's date: 2020  Patient name: Do Orozco  : 1980  MRN: [id number]  Referring provider: Lynn Gomes DPM  Dx:   Encounter Diagnosis     ICD-10-CM    1  Strain of calf muscle, left, subsequent encounter S87 441T                   Subjective: Pt denies any change in status since LV   Reports that he continues to be sore after working on replacing the rosalio in his home      Objective: See treatment diary below      Assessment: initiated therex as listed with no complaints other then slight fatigue  Pt is tender t/o manual tx  Plan: Continue per plan of care  Precautions: none      Manuals            graston kl kl           laser 4' 4'           k-tape prn                          Neuro Re-Ed                                                                                                        Ther Ex             gastroc stretch off step  30"x4           Soleus stretch  30"x4           Hr/tr             Short foot  5"x20           Achilles eccentric as pain reduces      2 up 1 down 10x2                                                  Ther Activity             bike  8' lvl 3                        Gait Training                                       Modalities

## 2020-07-31 ENCOUNTER — APPOINTMENT (OUTPATIENT)
Dept: PHYSICAL THERAPY | Facility: CLINIC | Age: 40
End: 2020-07-31
Payer: COMMERCIAL

## 2020-08-07 ENCOUNTER — OFFICE VISIT (OUTPATIENT)
Dept: PHYSICAL THERAPY | Facility: CLINIC | Age: 40
End: 2020-08-07
Payer: COMMERCIAL

## 2020-08-07 DIAGNOSIS — S86.812D STRAIN OF CALF MUSCLE, LEFT, SUBSEQUENT ENCOUNTER: Primary | ICD-10-CM

## 2020-08-07 PROCEDURE — 97110 THERAPEUTIC EXERCISES: CPT | Performed by: PHYSICAL THERAPIST

## 2020-08-07 PROCEDURE — 97140 MANUAL THERAPY 1/> REGIONS: CPT | Performed by: PHYSICAL THERAPIST

## 2020-08-07 PROCEDURE — 97530 THERAPEUTIC ACTIVITIES: CPT | Performed by: PHYSICAL THERAPIST

## 2020-08-07 NOTE — PROGRESS NOTES
Daily Note     Today's date: 2020  Patient name: Woodrow Salas  : 1980  MRN: [id number]  Referring provider: Milly Sigala DPM  Dx:   Encounter Diagnosis     ICD-10-CM    1  Strain of calf muscle, left, subsequent encounter  S87 389M                   Subjective: Pt reports continued pain in his calf with movement but no pain currently  Objective: See treatment diary below      Assessment: pt remain tender along musculotendinous junction  No pain reported with the progression  Plan: Continue per plan of care  Precautions: none      Manuals           graston kl kl kl          laser 4' 4' 4'          k-tape prn                          Neuro Re-Ed                                                                                                        Ther Ex             gastroc stretch off step  30"x4 30"x4          Soleus stretch  30"x4 30"x4          Hr/tr             Short foot  5"x20 hep          Achilles eccentric as pain reduces      2 up 1 down 10x2 x20 off step          Wobble board m/l a/p   30ea          rebounder ball toss   nv                       Ther Activity             bike  8' lvl 3 8' lvl 2                       Gait Training                                       Modalities

## 2020-08-11 ENCOUNTER — OFFICE VISIT (OUTPATIENT)
Dept: PHYSICAL THERAPY | Facility: CLINIC | Age: 40
End: 2020-08-11
Payer: COMMERCIAL

## 2020-08-11 DIAGNOSIS — S86.812D STRAIN OF CALF MUSCLE, LEFT, SUBSEQUENT ENCOUNTER: Primary | ICD-10-CM

## 2020-08-11 PROCEDURE — 97110 THERAPEUTIC EXERCISES: CPT | Performed by: PHYSICAL THERAPIST

## 2020-08-11 PROCEDURE — 97140 MANUAL THERAPY 1/> REGIONS: CPT | Performed by: PHYSICAL THERAPIST

## 2020-08-11 NOTE — PROGRESS NOTES
Daily Note     Today's date: 2020  Patient name: Cindy Urrutia  : 1980  MRN: [id number]  Referring provider: Kim Fernandez DPM  Dx:   Encounter Diagnosis     ICD-10-CM    1  Strain of calf muscle, left, subsequent encounter  C94 882D                   Subjective: Pt reports that he is feeling stiff but reports only min pain upon arrival to PT  Objective: See treatment diary below      Assessment: improved tolerance to Graston with less restriction noted  Pt remains tender with TPR especially t/o medial gastroc  Progressed therex as listed  Monitor response NV  Pt reports that he biked at home therefore this is deferred  Plan: Continue per plan of care  Precautions: none      Manuals          graston kl kl kl kl         laser 4' 4' 4' 4'         k-tape prn                          Neuro Re-Ed                                                                                                        Ther Ex             gastroc stretch off step  30"x4 30"x4 30"x4         Soleus stretch  30"x4 30"x4 30"x4         Hr/tr             Short foot  5"x20 hep          Achilles eccentric as pain reduces      2 up 1 down 10x2 x20 off step 20         Wobble board m/l a/p   30ea          rebounder ball toss   nv Blue foam 4# 30x2                      Ther Activity             bike  8' lvl 3 8' lvl 2 np                      Gait Training                                       Modalities

## 2020-08-18 ENCOUNTER — OFFICE VISIT (OUTPATIENT)
Dept: PHYSICAL THERAPY | Facility: CLINIC | Age: 40
End: 2020-08-18
Payer: COMMERCIAL

## 2020-08-18 DIAGNOSIS — S86.812A STRAIN OF LEFT CALF MUSCLE: Primary | ICD-10-CM

## 2020-08-18 PROCEDURE — 97110 THERAPEUTIC EXERCISES: CPT

## 2020-08-18 PROCEDURE — 97140 MANUAL THERAPY 1/> REGIONS: CPT

## 2020-08-18 NOTE — PROGRESS NOTES
Daily Note     Today's date: 2020  Patient name: Ed Lowe  : 1980  MRN: [id number]  Referring provider: Priya Webber DPM  Dx:   Encounter Diagnosis     ICD-10-CM    1  Strain of left calf muscle  S62 098G                   Subjective: Pt reports his L calf felt "a little bit rough" the day after his previous session, but no complaints otherwise  Pt states he finished exercising on a stationary bike shortly before today's session  Pt reports feeling better following today's session  Objective: See treatment diary below  Assessment: Pt tolerated progression of exercises well  Pt exhibited repeated LOB on SLS ball toss  Tenderness noted during Graston, with pronounced adhesions near the posterior aspect of the L knee  Plan: Continue per PT POC  Precautions: none    Manuals         graston kl kl kl kl 8'        laser 4' 4' 4' 4' 4'        k-tape prn                          Neuro Re-Ed                                                                                                        Ther Ex             gastroc stretch off step  30"x4 30"x4 30"x4 30" x 4        Soleus stretch  30"x4 30"x4 30"x4 30" x 4        Hr/tr             Short foot  5"x20 hep          Achilles eccentric as pain reduces      2 up 1 down 10x2 x20 off step 20 20        Wobble board m/l a/p   30ea          rebounder ball toss   nv Blue foam 4# 30x2 Blue foam 4# x 30 x 3                     Ther Activity             bike  8' lvl 3 8' lvl 2 np Np, pt biked prior to treatment                     Gait Training                                       Modalities                                             Mavin, SPTA  Isra Esteves, PTA

## 2020-08-26 ENCOUNTER — OFFICE VISIT (OUTPATIENT)
Dept: PHYSICAL THERAPY | Facility: CLINIC | Age: 40
End: 2020-08-26
Payer: COMMERCIAL

## 2020-08-26 DIAGNOSIS — S86.812A STRAIN OF LEFT CALF MUSCLE: Primary | ICD-10-CM

## 2020-08-26 DIAGNOSIS — S86.812D STRAIN OF CALF MUSCLE, LEFT, SUBSEQUENT ENCOUNTER: ICD-10-CM

## 2020-08-26 PROCEDURE — 97530 THERAPEUTIC ACTIVITIES: CPT | Performed by: PHYSICAL THERAPIST

## 2020-08-26 PROCEDURE — 97140 MANUAL THERAPY 1/> REGIONS: CPT | Performed by: PHYSICAL THERAPIST

## 2020-08-26 PROCEDURE — 97110 THERAPEUTIC EXERCISES: CPT | Performed by: PHYSICAL THERAPIST

## 2020-08-26 NOTE — PROGRESS NOTES
Daily Note     Today's date: 2020  Patient name: Lakesha Burgess  : 1980  MRN: [id number]  Referring provider: Lizzy Nicolas DPM  Dx:   Encounter Diagnosis     ICD-10-CM    1  Strain of left calf muscle  S86 812A    2  Strain of calf muscle, left, subsequent encounter  S86 812D                   Subjective: Pt reports minimal pain today before treatment session  Pt reported tightness in left calf/achilles  Objective: See treatment diary below  Assessment: Pt tolerated stretching but still reports his left calf and achilles are tight  Pt struggled during the single leg stance on blue foam exercise  Eccentric step downs could be added to help build dynamic stability in left ankle  Plan: Continue per PT POC  Precautions: none    Manuals        graston kl kl kl kl 8' 8"       laser 4' 4' 4' 4' 4' 4"       k-tape prn                          Neuro Re-Ed                                                                                                        Ther Ex             gastroc stretch off step  30"x4 30"x4 30"x4 30" x 4 30"x4 pro stretch       Soleus stretch  30"x4 30"x4 30"x4 30" x 4 30" x4       Hr/tr             Short foot  5"x20 hep          Achilles eccentric as pain reduces      2 up 1 down 10x2 x20 off step 20 20 x20 off step        Wobble board m/l a/p   30ea   30 ea       rebounder ball toss   nv Blue foam 4# 30x2 Blue foam 4# x 30 x 3 Blue foam 4# x 30 x 3                     Ther Activity             bike  8' lvl 3 8' lvl 2 np Np, pt biked prior to treatment 8"                    Gait Training                                       Modalities                                             Marcin Vanessa SPT supervised by Marva Goodman PT

## 2020-09-01 ENCOUNTER — OFFICE VISIT (OUTPATIENT)
Dept: PHYSICAL THERAPY | Facility: CLINIC | Age: 40
End: 2020-09-01
Payer: COMMERCIAL

## 2020-09-01 DIAGNOSIS — S86.812A STRAIN OF LEFT CALF MUSCLE: Primary | ICD-10-CM

## 2020-09-01 DIAGNOSIS — S86.812D STRAIN OF CALF MUSCLE, LEFT, SUBSEQUENT ENCOUNTER: ICD-10-CM

## 2020-09-01 PROCEDURE — 97140 MANUAL THERAPY 1/> REGIONS: CPT

## 2020-09-01 PROCEDURE — 97110 THERAPEUTIC EXERCISES: CPT

## 2020-09-01 NOTE — PROGRESS NOTES
Daily Note     Today's date: 2020  Patient name: Dimitrios Aguirre  : 1980  MRN: [id number]  Referring provider: Nat Baumann DPM  Dx:   Encounter Diagnosis     ICD-10-CM    1  Strain of left calf muscle  S86 812A    2  Strain of calf muscle, left, subsequent encounter  S86 812D                   Subjective: Pt reports he was on the elliptical for 30' yesterday without issue, and is experiencing no pain from walking  Objective: See treatment diary below      Assessment: Pt tolerated progression of TE program well, to include addition of BOSU step ups/lunges and standing SLR ABD on foam, but demonstrated difficulty with B/L balance  Pt experienced a throbbing sensation in his L calf following Achilles eccentrics that dissipated with rest  Adhesions in superior L gastroc and tenderness in L Achilles noted during Graston  Pt would benefit from continued PT to improve LE stability and flexibility to improve function  Plan: Continue treatment as per PT POC  Precautions: none    Manuals       toño kl kl kl kl 8' 8" 12'      laser 4' 4' 4' 4' 4' 4" 4'      k-tape prn                          Neuro Re-Ed                                                                                                        Ther Ex             gastroc stretch off step  30"x4 30"x4 30"x4 30" x 4 30"x4 pro stretch 30" x 4 pro stretch      Soleus stretch  30"x4 30"x4 30"x4 30" x 4 30" x4 30" x 4      Hr/tr             Short foot  5"x20 hep          Achilles eccentric as pain reduces      2 up 1 down 10x2 x20 off step 20 20 x20 off step  20 off step      Wobble board m/l a/p   30ea   30 ea 30 ea      rebounder ball toss   nv Blue foam 4# 30x2 Blue foam 4# x 30 x 3 Blue foam 4# x 30 x 3  Blue foam 4# x 30 x 3      BOSU step ups       L x 20      BOSU lunges       B/L x 20 ea      Standing SLR ABD       Blue foam, blue band B/L x20                   Ther Activity             bike  8' lvl 3 8' lvl 2 np Np, pt biked prior to treatment 8" 8'                   Gait Training                                       [facility], [facility], [place], [place]

## 2020-09-04 DIAGNOSIS — K59.04 CHRONIC IDIOPATHIC CONSTIPATION: ICD-10-CM

## 2020-09-04 NOTE — TELEPHONE ENCOUNTER
Pt asks for refill of Amitiza to [facility]  If ques CB# [phone]  Transf'd call to  to make appt

## 2020-09-04 NOTE — TELEPHONE ENCOUNTER
Called pt back, confirmed he is only taking it once daily  Order ready for your review and signature

## 2020-09-04 NOTE — TELEPHONE ENCOUNTER
Pt left  mssg stating he needs refill of med; made an appt for 9/25 but doesn't have enough meds to get to appt  CB# [phone]

## 2020-09-09 ENCOUNTER — OFFICE VISIT (OUTPATIENT)
Dept: PHYSICAL THERAPY | Facility: CLINIC | Age: 40
End: 2020-09-09
Payer: COMMERCIAL

## 2020-09-09 DIAGNOSIS — S86.812A STRAIN OF LEFT CALF MUSCLE: Primary | ICD-10-CM

## 2020-09-09 PROCEDURE — 97110 THERAPEUTIC EXERCISES: CPT | Performed by: PHYSICAL THERAPIST

## 2020-09-09 PROCEDURE — 97530 THERAPEUTIC ACTIVITIES: CPT | Performed by: PHYSICAL THERAPIST

## 2020-09-09 PROCEDURE — 97140 MANUAL THERAPY 1/> REGIONS: CPT | Performed by: PHYSICAL THERAPIST

## 2020-09-09 NOTE — PROGRESS NOTES
Daily Note     Today's date: 2020  Patient name: Christos Morrell  : 1980  MRN: [id number]  Referring provider: Winston Williamson DPM  Dx:   Encounter Diagnosis     ICD-10-CM    1  Strain of left calf muscle  E34 307H                   Subjective: Pt reports min pain upon arrival, reports that he has been working out on his elliptical with no difficulty, has not attempted running yet  Objective: See treatment diary below      Assessment: Increased reps with therex as listed with no complaints other  Then fatigue  Pt continues to have mild tenderness mid-substance of calf with graston      Plan: DC NV       Precautions: none    Manuals       toño kl kl kl kl 8' 8" 12'      laser 4' 4' 4' 4' 4' 4" 4'      k-tape prn                          Neuro Re-Ed                                                                                                        Ther Ex             gastroc stretch off step  30"x4 30"x4 30"x4 30" x 4 30"x4 pro stretch 30" x 4 pro stretch 30"x4     Soleus stretch  30"x4 30"x4 30"x4 30" x 4 30" x4 30" x 4 30"x4     Hr/tr             Short foot  5"x20 hep          Achilles eccentric as pain reduces      2 up 1 down 10x2 x20 off step 20 20 x20 off step  20 off step 20     Wobble board m/l a/p   30ea   30 ea 30 ea      rebounder ball toss   nv Blue foam 4# 30x2 Blue foam 4# x 30 x 3 Blue foam 4# x 30 x 3  Blue foam 4# x 30 x 3 30X3     BOSU step ups       L x 20 30     BOSU lunges       B/L x 20 ea 30     Standing SLR ABD       Blue foam, blue band B/L x20 x30ea                  Ther Activity             bike  8' lvl 3 8' lvl 2 np Np, pt biked prior to treatment 8" 8' np                  Gait Training                                       Modalities

## 2020-09-15 ENCOUNTER — OFFICE VISIT (OUTPATIENT)
Dept: PHYSICAL THERAPY | Facility: CLINIC | Age: 40
End: 2020-09-15
Payer: COMMERCIAL

## 2020-09-15 DIAGNOSIS — S86.812A STRAIN OF LEFT CALF MUSCLE: Primary | ICD-10-CM

## 2020-09-15 PROCEDURE — 97110 THERAPEUTIC EXERCISES: CPT | Performed by: PHYSICAL THERAPIST

## 2020-09-15 PROCEDURE — 97140 MANUAL THERAPY 1/> REGIONS: CPT | Performed by: PHYSICAL THERAPIST

## 2020-09-15 NOTE — PROGRESS NOTES
Daily Note /DC summary    Today's date: 9/15/2020  Patient name: Brandon Baltazar  : 1980  MRN: [id number]  Referring provider: Miguelina Bonner DPM  Dx:   Encounter Diagnosis     ICD-10-CM    1  Strain of left calf muscle  F11 399P                   Subjective: Pt reports reports that he has been increasing the time spent in the gym and that he is experiencing no increase in pain  Reports that he has not returned to running secondary to waiting for his new shoes to come in  Reports that he is comfortable completing all of his therex indep  Objective: See treatment diary below  Slight ttp mid-substance of gastroc  ROM and Strength are WFL      Assessment: Pt demonstated independence with all therex today and verbalizes understanding of proper progression    Plan: Pt has achieved all of his goals set at the start of PT he will be DC'd at this time with instruction to call the clinic if he has diffuculty with his program or if symptoms return       Precautions: none    Manuals       [facility]" 12'      laser 4' 4' 4' 4' 4' 4" 4'      k-tape prn                          Neuro Re-Ed                                                                                                        Ther Ex             gastroc stretch off step  30"x4 30"x4 30"x4 30" x 4 30"x4 pro stretch 30" x 4 pro stretch 30"x4     Soleus stretch  30"x4 30"x4 30"x4 30" x 4 30" x4 30" x 4 30"x4     Hr/tr             Short foot  5"x20 hep          Achilles eccentric as pain reduces      2 up 1 down 10x2 x20 off step 20 20 x20 off step  20 off step 20     Wobble board m/l a/p   30ea   30 ea 30 ea      rebounder ball toss   nv Blue foam 4# 30x2 Blue foam 4# x 30 x 3 Blue foam 4# x 30 x 3  Blue foam 4# x 30 x 3 30X3     BOSU step ups       L x 20 30     BOSU lunges       B/L x 20 ea 30     Standing SLR ABD       Blue foam, blue band B/L x20 x30ea                  Ther Activity             bike  8' lvl 3 8' lvl 2 np Np, pt biked prior to treatment 8" 8' np                  Gait Training                                       Modalities

## 2020-09-25 ENCOUNTER — TELEMEDICINE (OUTPATIENT)
Dept: GASTROENTEROLOGY | Facility: CLINIC | Age: 40
End: 2020-09-25
Payer: COMMERCIAL

## 2020-09-25 DIAGNOSIS — K59.04 CHRONIC IDIOPATHIC CONSTIPATION: Primary | ICD-10-CM

## 2020-09-25 DIAGNOSIS — K64.3 PROLAPSED INTERNAL HEMORRHOIDS, GRADE 4: ICD-10-CM

## 2020-09-25 PROCEDURE — 99213 OFFICE O/P EST LOW 20 MIN: CPT | Performed by: INTERNAL MEDICINE

## 2020-09-25 NOTE — LETTER
September 25, 2020     Janel Mcgowan, [address] [facility] [address]    Patient: Ravi Cavazos   YOB: 1980   Date of Visit: 9/25/2020       Dear Dr Darrell Arango: Thank you for referring Ravi Cavazos to me for evaluation  Below are my notes for this consultation  If you have questions, please do not hesitate to call me  I look forward to following your patient along with you  Sincerely,        Lebron Gosselin, DO        CC: Lewie Mortimer, MD Lebron Gosselin, DO  9/25/2020  1:51 PM  Incomplete    Virtual Regular Visit      Assessment/Plan:    1  Chronic idiopathic constipation  Controlled on Amitiza 24mcg once daily and Benefiber, but stools are urgent immediately upon awakening  Discussed Amitiza 8mcg twice daily but dosing is limited by cost   He will add a half dose of MiraLax daily with a goal of reducing the Amitiza to every other day  He will update me if symptoms flare before his next visit    2  Grade 4 hemorrhoids  Excellent relief after hemorrhoidectomy by Dr Sherrell Vargas in November 2019, no recurrence  Continue Benefiber  Reason for visit is   Chief Complaint   Patient presents with    Follow-up    Hemorrhoids    Virtual Regular Visit        Encounter provider Lebron Gosselin, DO    Provider located at [address] [address] [phone]      Recent Visits  No visits were found meeting these conditions  Showing recent visits within past 7 days and meeting all other requirements     Today's Visits  Date Type Provider Dept   09/25/20 Telemedicine Lebron Gosselin, DO [facility] [place]   Showing today's visits and meeting all other requirements     Future Appointments  No visits were found meeting these conditions     Showing future appointments within next 150 days and meeting all other requirements        The patient was identified by name and date of birth  Lawrence Oreilly was informed that this is a telemedicine visit and that the visit is being conducted through [facility] [place] and patient was informed that this is not a secure, HIPAA-complaint platform  He agrees to proceed     My office door was closed  No one else was in the room  He acknowledged consent and understanding of privacy and security of the video platform  The patient has agreed to participate and understands they can discontinue the visit at any time  Patient is aware this is a billable service  Subjective  Lawrence Oreilly is a 38 y o  male who presents for follow-up on chronic constipation and hemorrhoids  He was last seen in the office in November, he underwent urgent hemorrhoidectomy by Dr Emiliano Ontiveros in late November with excellent results  Since then stools have been fairly well controlled on Amitiza 24 once daily  Throughout his treatment dosing has been limited by medication cost   We tried Linzess in the past but induced diarrhea even at the low-dose  He takes Benefiber daily  He typically has 1 urgent soft stool each morning immediately upon awakening  He has associated abdominal cramping  He denies any upper GI complaints  He denies any rectal bleeding  Violet CASTILLO     Past Medical History:   Diagnosis Date    Constipation     Hemorrhoids     Hypercholesterolemia     Hypertension        Past Surgical History:   Procedure Laterality Date    COLONOSCOPY  03/12/2019    One hyperplastic polyp, recall at age 47    HERNIA REPAIR         Current Outpatient Medications   Medication Sig Dispense Refill    lubiprostone (AMITIZA) 24 mcg capsule Take 1 capsule (24 mcg total) by mouth daily 90 capsule 0    valsartan-hydrochlorothiazide (DIOVAN-HCT) 160-25 MG per tablet Take 1 tablet by mouth daily       No current facility-administered medications for this visit           No Known Allergies    Review of Systems  All other systems reviewed and are negative  Video Exam    There were no vitals filed for this visit  Physical Exam   Physical Exam   Constitutional: oriented to person, place, and time  appears well-developed and well-nourished  HENT:   Head: Normocephalic  Eyes: EOM are normal    Neck: Normal range of motion  Pulmonary/Chest: Effort normal    Abdominal: Normal appearance  Musculoskeletal: Normal range of motion  Neurological: alert and oriented to person, place, and time  Psychiatric: normal mood and affect  behavior is normal  Judgment and thought content normal    I spent 9 minutes directly with the patient during this visit      VIRTUAL VISIT DISCLAIMER    Lashae Saleem acknowledges that he has consented to an online visit or consultation  He understands that the online visit is based solely on information provided by him, and that, in the absence of a face-to-face physical evaluation by the physician, the diagnosis he receives is both limited and provisional in terms of accuracy and completeness  This is not intended to replace a full medical face-to-face evaluation by the physician  Lashae Saleem understands and accepts these terms

## 2020-09-25 NOTE — PROGRESS NOTES
Virtual Regular Visit      Assessment/Plan:    1  Chronic idiopathic constipation  Controlled on Amitiza 24mcg once daily and Benefiber, but stools are urgent immediately upon awakening  Discussed Amitiza 8mcg twice daily but dosing is limited by cost   He will add a half dose of MiraLax daily with a goal of reducing the Amitiza to every other day  He will update me if symptoms flare before his next visit    2  Grade 4 hemorrhoids  Excellent relief after hemorrhoidectomy by Dr Daljit Vanegas in November 2019, no recurrence  Continue Benefiber  Reason for visit is   Chief Complaint   Patient presents with    Follow-up    Hemorrhoids    Virtual Regular Visit        Encounter provider Jaleel Byrd DO    Provider located at [address]  [facility] [address] [phone]      Recent Visits  No visits were found meeting these conditions  Showing recent visits within past 7 days and meeting all other requirements     Today's Visits  Date Type Provider Dept   09/25/20 Telemedicine Jaleel Byrd DO [facility] [place]   Showing today's visits and meeting all other requirements     Future Appointments  No visits were found meeting these conditions  Showing future appointments within next 150 days and meeting all other requirements        The patient was identified by name and date of birth  Bettina Jones was informed that this is a telemedicine visit and that the visit is being conducted through [address] [place] [address] and patient was informed that this is not a secure, HIPAA-complaint platform  He agrees to proceed     My office door was closed  No one else was in the room  He acknowledged consent and understanding of privacy and security of the video platform  The patient has agreed to participate and understands they can discontinue the visit at any time  Patient is aware this is a billable service  Subjective  Argelia Jaramillo is a 38 y o  male who presents for follow-up on chronic constipation and hemorrhoids  He was last seen in the office in November, he underwent urgent hemorrhoidectomy by Dr Josue Almeida in late November with excellent results  Since then stools have been fairly well controlled on Amitiza 24 once daily  Throughout his treatment dosing has been limited by medication cost   We tried Linzess in the past but induced diarrhea even at the low-dose  He takes Benefiber daily  He typically has 1 urgent soft stool each morning immediately upon awakening  He has associated abdominal cramping  He denies any upper GI complaints  He denies any rectal bleeding  Delma CASTILLO     Past Medical History:   Diagnosis Date    Constipation     Hemorrhoids     Hypercholesterolemia     Hypertension        Past Surgical History:   Procedure Laterality Date    COLONOSCOPY  03/12/2019    One hyperplastic polyp, recall at age 47    HERNIA REPAIR         Current Outpatient Medications   Medication Sig Dispense Refill    lubiprostone (AMITIZA) 24 mcg capsule Take 1 capsule (24 mcg total) by mouth daily 90 capsule 0    valsartan-hydrochlorothiazide (DIOVAN-HCT) 160-25 MG per tablet Take 1 tablet by mouth daily       No current facility-administered medications for this visit  No Known Allergies    Review of Systems  All other systems reviewed and are negative  Video Exam    There were no vitals filed for this visit  Physical Exam   Physical Exam   Constitutional: oriented to person, place, and time  appears well-developed and well-nourished  HENT:   Head: Normocephalic  Eyes: EOM are normal    Neck: Normal range of motion  Pulmonary/Chest: Effort normal    Abdominal: Normal appearance  Musculoskeletal: Normal range of motion  Neurological: alert and oriented to person, place, and time  Psychiatric: normal mood and affect   behavior is normal  Judgment and thought content normal    I spent 9 minutes directly with the patient during this visit      VIRTUAL VISIT DISCLAIMER    Ed Chrissy acknowledges that he has consented to an online visit or consultation  He understands that the online visit is based solely on information provided by him, and that, in the absence of a face-to-face physical evaluation by the physician, the diagnosis he receives is both limited and provisional in terms of accuracy and completeness  This is not intended to replace a full medical face-to-face evaluation by the physician  Ed Lowe understands and accepts these terms

## 2020-12-28 DIAGNOSIS — K59.04 CHRONIC IDIOPATHIC CONSTIPATION: ICD-10-CM

## 2020-12-29 ENCOUNTER — TELEMEDICINE (OUTPATIENT)
Dept: GASTROENTEROLOGY | Facility: CLINIC | Age: 40
End: 2020-12-29
Payer: COMMERCIAL

## 2020-12-29 VITALS — HEIGHT: 70 IN | WEIGHT: 225 LBS | BODY MASS INDEX: 32.21 KG/M2

## 2020-12-29 DIAGNOSIS — K64.2 GRADE III HEMORRHOIDS: Primary | ICD-10-CM

## 2020-12-29 DIAGNOSIS — K59.04 CHRONIC IDIOPATHIC CONSTIPATION: ICD-10-CM

## 2020-12-29 PROCEDURE — 99213 OFFICE O/P EST LOW 20 MIN: CPT | Performed by: INTERNAL MEDICINE

## 2021-02-25 ENCOUNTER — TELEPHONE (OUTPATIENT)
Dept: GASTROENTEROLOGY | Facility: CLINIC | Age: 41
End: 2021-02-25

## 2021-03-05 NOTE — TELEPHONE ENCOUNTER
Spoke to patient  He had checked with [facility] and the Amitiza was $1000 00  Miltoneri Smith had adjusted his prescription and he is taking differently and should have enough to last for a while  I did give him information on Trulance 3 mg once a day so he can check on that with [facility]  Pt is also anticipating changing jobs and may be getting new insurance  He will call us when/if he needs med

## 2021-03-05 NOTE — TELEPHONE ENCOUNTER
Unidentified male from [address]  mss asking for [phone] using Ref# [id number]  I called for more info (Pt name & )  Spoke w/ Trell Ramirez - provided Pt name & ; states Amitiza 24 mg not cov'd by Ins - two alternatives would be Trulance or Linzess

## 2021-07-24 ENCOUNTER — OFFICE VISIT (OUTPATIENT)
Dept: URGENT CARE | Facility: CLINIC | Age: 41
End: 2021-07-24
Payer: COMMERCIAL

## 2021-07-24 VITALS
SYSTOLIC BLOOD PRESSURE: 157 MMHG | BODY MASS INDEX: 34.63 KG/M2 | TEMPERATURE: 98.5 F | WEIGHT: 233.8 LBS | OXYGEN SATURATION: 98 % | DIASTOLIC BLOOD PRESSURE: 94 MMHG | HEART RATE: 68 BPM | HEIGHT: 69 IN | RESPIRATION RATE: 16 BRPM

## 2021-07-24 DIAGNOSIS — T15.91XA FOREIGN BODY OF RIGHT EYE, INITIAL ENCOUNTER: Primary | ICD-10-CM

## 2021-07-24 PROCEDURE — 99213 OFFICE O/P EST LOW 20 MIN: CPT

## 2021-07-24 RX ORDER — ERYTHROMYCIN 5 MG/G
OINTMENT OPHTHALMIC
Qty: 3.5 G | Refills: 0 | Status: SHIPPED | OUTPATIENT
Start: 2021-07-24

## 2021-07-24 NOTE — PATIENT INSTRUCTIONS
Eye Foreign Body   AMBULATORY CARE:   An eye foreign body (EFB)  is an object that gets stuck in your eye  Tiny pieces of metal, dust, wood, and sand are the most common foreign bodies  Signs and symptoms:   · The feeling that something is in your eye    · Eye pain, redness, or watering    · Sensitivity to light    · Blurry vision or changes in your vision    Seek care immediately if:   · You suddenly lose your vision  · You have severe eye pain  Call your doctor or ophthalmologist if:   · You have new or worse eye swelling  · Your symptoms do not get better, even after the foreign body is removed  · You have white or yellow fluid draining from your eye  · You have questions or concerns about your condition or care  Treatment for an EFB  will include medicine to decrease pain and prevent an infection  Your healthcare provider may numb your eye and flush it with liquid to help remove the FB  The provider may also use a cotton swab or other tools to help remove the FB  If the FB is hard to remove or has damaged deeper parts of your eye, you will need surgery to remove it  Help your eye heal:  You may have pain, sensitivity to light, or blurry vision for a few days  Do the following to help your eye heal:  · Do not rub your eye  This may cause more damage or infection  · Do not wear your contacts lenses until your eye heals  Ask your healthcare provider how long to follow this direction  · Wear sunglasses as directed  Sunglasses help protect the eye and decrease sensitivity to light  Prevent another EFB:   · Wear safety glasses, eye shields, or goggles  These items can prevent eye injury  Make sure the eyewear wraps around the sides of your face  Wear these items while you work with chemicals, metal, wood, or bodily fluids such as blood  Also wear protective eyewear during sports such as racquetball or swimming  Do not use regular eye glasses for eye protection   They will not protect your eyes from foreign bodies or chemicals  · Use contact lenses as directed  Wash your hands before you clean, insert, or remove your contacts  Insert and remove contact lenses correctly  Clean and change your contacts as directed to help prevent eye damage or infection  Follow up with your doctor or ophthalmologist in 1 to 2 days:  Write down your questions so you remember to ask them during your visits  © Copyright [employer] 2021 Information is for End User's use only and may not be sold, redistributed or otherwise used for commercial purposes  All illustrations and images included in CareNotes® are the copyrighted property of [employer]  or [address] [place], [place] [address]   The above information is an  only  It is not intended as medical advice for individual conditions or treatments  Talk to your doctor, nurse or pharmacist before following any medical regimen to see if it is safe and effective for you

## 2021-08-23 NOTE — PROGRESS NOTES
[facility]        NAME: Aries Chawla is a 42 y o  male  : 1980    MRN: [id number]  DATE: 2021  TIME: 12:11 AM    Assessment and Plan   Foreign body of right eye, initial encounter [place]  1  Foreign body of right eye, initial encounter  erythromycin (ILOTYCIN) ophthalmic ointment         Patient Instructions       Follow up with PCP in 3-5 days  Proceed to  ER if symptoms worsen  Chief Complaint     Chief Complaint   Patient presents with    Eye Redness     Pt believes a piece of metal is in R eye after grinding metal   Pt was wearing safety glasses at the time  Sclera is red, tearing, hurts when blinking, burning  Denies change in vision  History of Present Illness         79-year-old male presents the clinic with redness and irritation to his right eye  Patient states that he was grinding metal and believes that a piece of metal landed in his eye  Patient states that he was wearing safety glasses at that time  Patient states that his eye is currently red and tearing  Patient has a sensation of a foreign body in his eye when he blinks  Patient denies any blurry or double vision  Review of Systems   Review of Systems   Eyes: Positive for photophobia, pain, discharge and redness  Negative for visual disturbance  Current Medications       Current Outpatient Medications:     erythromycin (ILOTYCIN) ophthalmic ointment, Place a 1/2 inch ribbon of ointment into the right lower eyelid   Use 4 times a day for 7 days, Disp: 3 5 g, Rfl: 0    lubiprostone (AMITIZA) 24 mcg capsule, Take 1 capsule (24 mcg total) by mouth 2 (two) times a day with meals, Disp: 180 capsule, Rfl: 1    valsartan-hydrochlorothiazide (DIOVAN-HCT) 160-25 MG per tablet, Take 1 tablet by mouth daily, Disp: , Rfl:     Current Allergies     Allergies as of 2021    (No Known Allergies)            The following portions of the patient's history were reviewed and updated as appropriate: allergies, current medications, past family history, past medical history, past social history, past surgical history and problem list      Past Medical History:   Diagnosis Date    Constipation     Gangrene of hemorrhoid ([address] )     Hemorrhoids     Hypercholesterolemia     Hypertension        Past Surgical History:   Procedure Laterality Date    COLONOSCOPY  03/12/2019    One hyperplastic polyp, recall at age 47   Ies[address]         Family History   Problem Relation Age of Onset    Hypertension Mother     Hypertension Father     Hypertension Brother     Colon cancer Neg Hx     Colon polyps Neg Hx          Medications have been verified  Objective   /94   Pulse 68   Temp 98 5 °F (36 9 °C)   Resp 16   Ht 5' 9" (1 753 m)   Wt 106 kg (233 lb 12 8 oz)   SpO2 98%   BMI 34 53 kg/m²   No LMP for male patient  Physical Exam     Physical Exam  Constitutional:       General: He is not in acute distress  Appearance: He is well-developed  He is not diaphoretic  HENT:      Head: Normocephalic and atraumatic  Nose: Nose normal    Eyes:      General:         Right eye: Foreign body present  Extraocular Movements: Extraocular movements intact  Conjunctiva/sclera: Conjunctivae normal       Pupils: Pupils are equal, round, and reactive to light  Slit lamp exam:     Right eye: Photophobia present  Left eye: No photophobia  Comments:  Small black foreign body noted on fluorescein stain and was able to remove without difficulty  No corneal abrasions noted   Pulmonary:      Effort: Pulmonary effort is normal    Musculoskeletal:         General: Normal range of motion  Cervical back: Normal range of motion  Skin:     General: Skin is warm and dry  Neurological:      Mental Status: He is alert and oriented to person, place, and time

## 2022-02-04 ENCOUNTER — TELEMEDICINE (OUTPATIENT)
Dept: GASTROENTEROLOGY | Facility: CLINIC | Age: 42
End: 2022-02-04
Payer: COMMERCIAL

## 2022-02-04 VITALS — WEIGHT: 230 LBS | HEIGHT: 69 IN | BODY MASS INDEX: 34.07 KG/M2

## 2022-02-04 DIAGNOSIS — K58.1 IRRITABLE BOWEL SYNDROME WITH CONSTIPATION: Primary | ICD-10-CM

## 2022-02-04 PROCEDURE — 99211 OFF/OP EST MAY X REQ PHY/QHP: CPT | Performed by: INTERNAL MEDICINE

## 2022-02-04 NOTE — PROGRESS NOTES
Virtual Brief Visit    Patient is located in the following state in which I hold an active license [place]      Assessment/Plan:    1  Chronic idiopathic constipation  Remains well controlled on current regimen; due to cost he alternates Amitiza 24 mcg daily on one day with MiraLax the next  Stools generally well controlled with rare straining  He will continue the same medication regimen and contact me if constipation progresses      2  Grade 4 hemorrhoids  Excellent relief after hemorrhoidectomy by Dr Prakash Morel November 2019, no recurrence   Continue Benefiber  Recent Visits  No visits were found meeting these conditions  Showing recent visits within past 7 days and meeting all other requirements  Today's Visits  Date Type Provider Dept   02/04/22 Telemedicine Jodi Vences DO [facility]   Showing today's visits and meeting all other requirements  Future Appointments  No visits were found meeting these conditions    Showing future appointments within next 150 days and meeting all other requirements         I spent 7 minutes directly with the patient during this visit

## 2022-11-11 NOTE — PROGRESS NOTES
[id number] - Outpatient Follow-up Note  Rosie Vaz 35 y o  male MRN: [id number]  Encounter: [id number]    ASSESSMENT AND PLAN:      1  Rectal pain  2  Rectal bleeding  Longstanding issues with grade 3 internal hemorrhoids  He completed a round of banding approximately a year ago with some alleviation  Rectal pain and bleeding reoccurred any underwent a recent banding 4 days ago  Following day the rectal bleeding and pain reoccurred and progressed over the past few days  Suspect secondary to 2 large external thrombosed hemorrhoids on today's examination  - referral to Dr Jean-Claude Dia, from colorectal surgery as soon as possible  - increase fluid and fiber  - daily fiber supplementation  - continue Amitiza 24 micro g daily    3  Other constipation  Controlled with Amitiza 24mcg daily    4  Colon cancer screening  Recall:  Advised at age 48    8  Grade III hemorrhoids  Noted on colonoscopy performed March of this year         Followup Appointment:  4 weeks  ______________________________________________________________________    Chief Complaint   Patient presents with    Rectal bleeding and pain since banding     HPI:    Patient returns to the office for progressive rectal pain and increased bright red blood per rectum since undergoing a banding procedure 4 days ago  Immediately after banding he did not experience any rectal pain or bleeding  However, the following day following a soft, formed bowel movement he began to experience excruciating rectal pain and progressive rectal bleeding  The pain and bleeding has been ongoing since hemorrhoidal banding  He did complete a course of hemorrhoidal banding (total of 3 bandings) a little less than a year ago with alleviation up until recently  Denies constipation, diarrhea, melena, fevers or chills  Denies any rectal discharge or abdominal pain       Historical Information   Past Medical History:   Diagnosis Date    Constipation     Hemorrhoids     Hypercholesterolemia     Hypertension      Past Surgical History:   Procedure Laterality Date    COLONOSCOPY  03/12/2019    One hyperplastic polyp, recall at age 47   [address] History     Substance and Sexual Activity   Alcohol Use Yes    Frequency: 2-4 times a month     Social History     Substance and Sexual Activity   Drug Use Never     Social History     Tobacco Use   Smoking Status Never Smoker   Smokeless Tobacco Never Used     Family History   Problem Relation Age of Onset    Hypertension Mother     Hypertension Father     Hypertension Brother     Colon cancer Neg Hx     Colon polyps Neg Hx          Current Outpatient Medications:     lubiprostone (AMITIZA) 24 mcg capsule    valsartan-hydrochlorothiazide (DIOVAN-HCT) 160-25 MG per tablet  No Known Allergies  Reviewed medications and allergies and updated as indicated    PHYSICAL EXAM:    Blood pressure 132/84, pulse 86, height 5' 9" (1 753 m), weight 101 kg (222 lb)  Body mass index is 32 78 kg/m²  General Appearance: NAD, cooperative, alert  Eyes: Anicteric, PERRLA, EOMI  ENT:  Normocephalic, atraumatic, normal mucosa  Neck:  Supple, symmetrical, trachea midline  Resp:  Clear to auscultation bilaterally; no rales, rhonchi or wheezing; respirations unlabored   CV:  S1 S2, Regular rate and rhythm; no murmur, rub, or gallop  GI:  Soft, non-tender, non-distended; normal bowel sounds; no masses, no organomegaly   Rectal:  2 large thrombosed external hemorrhoids, extremely painful to palpation  Musculoskeletal: No cyanosis, clubbing or edema  Normal ROM    Skin:  No jaundice, rashes, or lesions   Heme/Lymph: No palpable cervical lymphadenopathy  Psych: Normal affect, good eye contact  Neuro: No gross deficits, AAOx3    Lab Results:   No results found for: WBC, HGB, HCT, MCV, PLT  No results found for: NA, K, CL, CO2, ANIONGAP, BUN, CREATININE, GLUCOSE, GLUF, CALCIUM, CORRECTEDCA, AST, ALT, ALKPHOS, PROT, BILITOT, EGFR  No results found for: IRON, TIBC, FERRITIN  No results found for: LIPASE    Radiology Results:   No results found  11-Nov-2022 15:00

## 2022-12-19 DIAGNOSIS — K59.04 CHRONIC IDIOPATHIC CONSTIPATION: ICD-10-CM

## 2022-12-22 ENCOUNTER — TELEPHONE (OUTPATIENT)
Dept: GASTROENTEROLOGY | Facility: CLINIC | Age: 42
End: 2022-12-22

## 2022-12-22 DIAGNOSIS — K59.04 CHRONIC IDIOPATHIC CONSTIPATION: Primary | ICD-10-CM

## 2022-12-22 RX ORDER — PLECANATIDE 3 MG/1
3 TABLET ORAL DAILY
Qty: 90 TABLET | Refills: 3 | Status: SHIPPED | OUTPATIENT
Start: 2022-12-22 | End: 2022-12-29 | Stop reason: ALTCHOICE

## 2022-12-22 NOTE — TELEPHONE ENCOUNTER
[facility] sent request for alternative medication  Amitiza not covered  Patient failed Rudi Hem in the past but Trulance is noted as an alternative  You did attempt to order in the past but it was cost prohibitive for patient at that time  Please advise if okay to switch to Trulance

## 2022-12-29 ENCOUNTER — TELEPHONE (OUTPATIENT)
Dept: OTHER | Facility: OTHER | Age: 42
End: 2022-12-29

## 2022-12-29 DIAGNOSIS — K59.00 CONSTIPATION, UNSPECIFIED CONSTIPATION TYPE: Primary | ICD-10-CM

## 2022-12-29 NOTE — TELEPHONE ENCOUNTER
Patient would like to go back on the Amitiza 24 MG which works, and they located a pharmacy where the cost is reasonable  [facility] in [facility] # [phone]
